# Patient Record
(demographics unavailable — no encounter records)

---

## 2025-03-18 NOTE — HISTORY OF PRESENT ILLNESS
[Born at ___ Wks Gestation] : The patient was born at [unfilled] weeks gestation [C/S] : via  section [C/S Indication: ____] : ( [unfilled] ) [Other: _____] : at [unfilled] [None] : There were no delivery complications [BW: _____] : weight of [unfilled] [Length: _____] : length of [unfilled] [HC: _____] : head circumference of [unfilled] [DW: _____] : Discharge weight was [unfilled] [Time of Birth: _____] : Time of birth was [unfilled] [Age: ___] : [unfilled] year old mother [G: ___] : G [unfilled] [P: ___] : P [unfilled] [Other: ____] : [unfilled] [Yes] : Yes [] : Circumcision: Yes [Breast milk] : breast milk [Expressed Breast milk ___oz/feed] : [unfilled] oz of expressed breast milk per feed [Hours between feeds ___] : Child is fed every [unfilled] hours [Normal] : Normal [___ voids per day] : [unfilled] voids per day [Frequency of stools: ___] : Frequency of stools: [unfilled]  stools [per day] : per day. [Green/brown] : green/brown [Yellow] : yellow [Pasty] : pasty [In Bassinet/Crib] : sleeps in bassinet/crib [On back] : sleeps on back [Pacifier] : Uses pacifier [No] : No cigarette smoke exposure [Rear facing car seat in back seat] : Rear facing car seat in back seat [Carbon Monoxide Detectors] : Carbon monoxide detectors at home [Smoke Detectors] : Smoke detectors at home. [NO] : No [(1) _____] : [unfilled] [(5) _____] : [unfilled]

## 2025-03-18 NOTE — PHYSICAL EXAM
[Alert] : alert [Normocephalic] : normocephalic [Molding] : molding [Caput Succedaneum] : caput succedaneum [Flat Open Anterior [place]] : flat open anterior fontanelle [PERRL] : PERRL [Red Reflex Bilateral] : red reflex bilateral [Normally Placed Ears] : normally placed ears [Auricles Well Formed] : auricles well formed [Nares Patent] : nares patent [Palate Intact] : palate intact [Uvula Midline] : uvula midline [Supple, full passive range of motion] : supple, full passive range of motion [Symmetric Chest Rise] : symmetric chest rise [Clear to Auscultation Bilaterally] : clear to auscultation bilaterally [Regular Rate and Rhythm] : regular rate and rhythm [S1, S2 present] : S1, S2 present [+2 Femoral Pulses] : +2 femoral pulses [Soft] : soft [Bowel Sounds] : bowel sounds present [Umbilical Stump Dry, Clean, Intact] : umbilical stump dry, clean, intact [Normal external genitailia] : normal external genitalia [Central Urethral Opening] : central urethral opening [Testicles Descended Bilaterally] : testicles descended bilaterally [Patent] : patent [Normally Placed] : normally placed [No Abnormal Lymph Nodes Palpated] : no abnormal lymph nodes palpated [Symmetric Flexed Extremities] : symmetric flexed extremities [Startle Reflex] : startle reflex present [Suck Reflex] : suck reflex present [Rooting] : rooting reflex present [Palmar Grasp] : palmar grasp present [Plantar Grasp] : plantar reflex present [Symmetric Fernie] : symmetric [place] [Dermal Melanocytosis] : Dermal Melanocytosis

## 2025-03-18 NOTE — PHYSICAL EXAM
[Alert] : alert [Normocephalic] : normocephalic [Molding] : molding [Caput Succedaneum] : caput succedaneum [Flat Open Anterior [place]] : flat open anterior fontanelle [PERRL] : PERRL [Red Reflex Bilateral] : red reflex bilateral [Normally Placed Ears] : normally placed ears [Auricles Well Formed] : auricles well formed [Nares Patent] : nares patent [Palate Intact] : palate intact [Uvula Midline] : uvula midline [Supple, full passive range of motion] : supple, full passive range of motion [Symmetric Chest Rise] : symmetric chest rise [Clear to Auscultation Bilaterally] : clear to auscultation bilaterally [Regular Rate and Rhythm] : regular rate and rhythm [S1, S2 present] : S1, S2 present [+2 Femoral Pulses] : +2 femoral pulses [Soft] : soft [Bowel Sounds] : bowel sounds present [Umbilical Stump Dry, Clean, Intact] : umbilical stump dry, clean, intact [Normal external genitailia] : normal external genitalia [Central Urethral Opening] : central urethral opening [Testicles Descended Bilaterally] : testicles descended bilaterally [Patent] : patent [Normally Placed] : normally placed [No Abnormal Lymph Nodes Palpated] : no abnormal lymph nodes palpated [Symmetric Flexed Extremities] : symmetric flexed extremities [Startle Reflex] : startle reflex present [Suck Reflex] : suck reflex present [Rooting] : rooting reflex present [Palmar Grasp] : palmar grasp present [Plantar Grasp] : plantar reflex present [Symmetric Fernie] : symmetric [place] [Dermal Melanocytosis] : Dermal Melanocytosis

## 2025-04-18 NOTE — HISTORY OF PRESENT ILLNESS
[Parents] : parents [Breast milk] : breast milk [Formula ___ oz/feed] : [unfilled] oz of formula per feed [Formula ___ oz in 24hrs] : [unfilled] oz of formula in 24 hours [Hours between feeds ___] : Child is fed every [unfilled] hours [___ Feeding per 24 hrs] : a  total of [unfilled] feedings in 24 hours [Normal] : Normal [___ voids per day] : [unfilled] voids per day [Frequency of stools: ___] : Frequency of stools: [unfilled]  stools [per day] : per day. [Yellow] : yellow [Loose] : loose consistency [In Bassinet/Crib] : sleeps in bassinet/crib [On back] : sleeps on back [Pacifier use] : Pacifier use [No] : No cigarette smoke exposure [Rear facing car seat in back seat] : Rear facing car seat in back seat [Carbon Monoxide Detectors] : Carbon monoxide detectors at home [Smoke Detectors] : Smoke detectors at home. [NO] : No [Co-sleeping] : no co-sleeping [Loose bedding, pillow, toys, and/or bumpers in crib] : no loose bedding, pillow, toys, and/or bumpers in crib [Exposure to electronic nicotine delivery system] : No exposure to electronic nicotine delivery system [Water heater temperature set at <120 degrees F] : Water heater temperature not set at <120 degrees F [At risk for exposure to TB] : Not at risk for exposure to Tuberculosis  [FreeTextEntry1] : 37 day old M ex- 39.2 weeker born via C/S for failure to progress presenting for HCM. Mother states concerns with baby being more fussy the past week, he is consolable but has been crying more throughout the day. Sleeps well at night. Mother also states small area of yellow patchy lesions between eyebrows and on scalp.      SDOH Screening Questionnaire   SDOH (Social Determinants of Health) Questionnaire: 1. Housing: Do you worry that in the upcoming months, your family, or child, may not have a safe or stable place to live? no 2. Food security: Within the last 12 months, did the food you bought not last and you did not have money to buy more? no 3. Community: Do you need help getting public benefits like food stamps or WIC? no 4. Transportation: Does your child have chronic medical condition and therefore struggle with transportation to attend medical appointments? no 5. Healthcare Access: Do you need help getting health or dental insurance? no       Result: Negative Screen. No further intervention needed.

## 2025-04-18 NOTE — REVIEW OF SYSTEMS
[Fussy] : fussy [Crying] : crying [Dry Skin] : dry skin [Seborrhea] : seborrhea [Negative] : Genitourinary

## 2025-04-18 NOTE — DISCUSSION/SUMMARY
[Normal Growth] : growth [Normal Development] : development  [No Elimination Concerns] : elimination [Continue Regimen] : feeding [No Skin Concerns] : skin [Normal Sleep Pattern] : sleep [None] : no medical problems [Anticipatory Guidance Given] : Anticipatory guidance addressed as per the history of present illness section [Parental Well-Being] : parental well-being [Family Adjustment] : family adjustment [Feeding Routines] : feeding routines [Infant Adjustment] : infant adjustment [Safety] : safety [Age Approp Vaccines] : Age appropriate vaccines administered [No Medications] : ~He/She~ is not on any medications [Parent/Guardian] : Parent/Guardian [FreeTextEntry1] : 37 day old M presenting for [facility]. Growth and development normal. PE remarkable for dermal melanocytosis on bilateral ankles and buttocks and one hyperpigmented birthmark on R shoulder and nostril. Patchy scaling crusts on the scalp and between eyebrows noted c/w seborrheic dermatitis. Maternal depression screen passed. Declines Hep B vaccine.   [facility] - Routine care & anticipatory guidance given - Continue ad reny feeds, receiving Vitamin D via maternal breastmilk (taking prenatals 10,000IU daily) - Hep B declined this visit. Discussed in length importance of immunization to provide protection against serious bacterial and viral illnesses. Side effects of vaccines discussed as well as risk vs. benefit. Parents were provided paper resources regarding each vaccine. Will readdress at next visit. - RTC for 2 month old HCM and prn   [facility] - Massage oil in to scalp 5 minutes before bathing infant to treat seborrhea.  - While shampooing lift flakes with fingers.  - Side effect of treatment may include but not limited to erythema of scalp - If no improvement, RTC for evaluation  Caretaker expressed understanding of the plan and agrees. All questions were answered.

## 2025-04-18 NOTE — PHYSICAL EXAM
[Alert] : alert [Crying] : crying [Normocephalic] : normocephalic [Flat Open Anterior [place]] : flat open anterior fontanelle [PERRL] : PERRL [Red Reflex Bilateral] : red reflex bilateral [Normally Placed Ears] : normally placed ears [Auricles Well Formed] : auricles well formed [Clear Tympanic membranes] : clear tympanic membranes [Light reflex present] : light reflex present [Bony landmarks visible] : bony landmarks visible [Nares Patent] : nares patent [Palate Intact] : palate intact [Uvula Midline] : uvula midline [Supple, full passive range of motion] : supple, full passive range of motion [Symmetric Chest Rise] : symmetric chest rise [Clear to Auscultation Bilaterally] : clear to auscultation bilaterally [Regular Rate and Rhythm] : regular rate and rhythm [S1, S2 present] : S1, S2 present [+2 Femoral Pulses] : +2 femoral pulses [Soft] : soft [Bowel Sounds] : bowel sounds present [Normal external genitailia] : normal external genitalia [Central Urethral Opening] : central urethral opening [Testicles Descended Bilaterally] : testicles descended bilaterally [Normally Placed] : normally placed [No Abnormal Lymph Nodes Palpated] : no abnormal lymph nodes palpated [Symmetric Flexed Extremities] : symmetric flexed extremities [Startle Reflex] : startle reflex present [Suck Reflex] : suck reflex present [Rooting] : rooting reflex present [Palmar Grasp] : palmar grasp reflex present [Plantar Grasp] : plantar grasp reflex present [Symmetric Fernie] : symmetric [place] [Dermal Melanocytosis] : Dermal Melanocytosis [Acute Distress] : no acute distress [Consolable] : inconsolable [Discharge] : no discharge [Palpable Masses] : no palpable masses [Murmurs] : no murmurs [Tender] : nontender [Distended] : not distended [Hepatomegaly] : no hepatomegaly [Splenomegaly] : no splenomegaly [Magdaleno-Ortolani] : negative Magdaleno-Ortolani [Spinal Dimple] : no spinal dimple [Tuft of Hair] : no tuft of hair [Jaundice] : no jaundice [Rash and/or lesion present] : no rash/lesion [FreeTextEntry2] : Cradle cap to scalp [de-identified] : Generalized dry skin

## 2025-06-06 NOTE — HISTORY OF PRESENT ILLNESS
[Expressed Breast milk ___oz/feed] : [unfilled] oz of expressed breast milk per feed [Hours between feeds ___] : Child is fed every [unfilled] hours [___ Feeding per 24 hrs] : a  total of [unfilled] feedings in 24 hours [Normal] : Normal [Frequency of stools: ___] : Frequency of stools: [unfilled]  stools [every ___ day(s)] : every [unfilled] day(s). [In Bassinet/Crib] : sleeps in bassinet/crib [On back] : sleeps on back [Pacifier use] : Pacifier use [No] : No cigarette smoke exposure [Rear facing car seat in back seat] : Rear facing car seat in back seat [Carbon Monoxide Detectors] : Carbon monoxide detectors at home [Smoke Detectors] : Smoke detectors at home. [NO] : No [Co-sleeping] : no co-sleeping [Loose bedding, pillow, toys, and/or bumpers in crib] : no loose bedding, pillow, toys, and/or bumpers in crib [FreeTextEntry1] : 2-month-old male brought in by mother presents for well child visit. Mom reports he has been having less bowel movements x 2 weeks. She states he used to have 1 every day and no he his having 1 every 2 days. HE has 6+ voids per day. Bowel movements appear normal, no change in diet. Mom also states he is gassier than normal as well. Pt is  only every 2-3 hours. She is concerned in past weeks he no longer wants to nap but reports he sleeps well throughout the night (8 hours).  Mom also notices that the umbilicus protrudes a decent amount when pt is crying but reduces once pt stops. She is wondering if this is normal.  Does not want vaccinations at this time  Denies fever, rashes, sick contacts, changes in urine.     SDOH Screening Questionnaire   SDOH (Social Determinants of Health) Questionnaire: 1. Housing: Do you worry that in the upcoming months, your family, or child, may not have a safe or stable place to live? no 2. Food security: Within the last 12 months, did the food you bought not last and you did not have money to buy more? no 3. Community: Do you need help getting public benefits like food stamps or WIC? no 4. Transportation: Does your child have chronic medical condition and therefore struggle with transportation to attend medical appointments? no 5. Healthcare Access: Do you need help getting health or dental insurance? no       Result: Negative Screen. No further intervention needed.

## 2025-06-06 NOTE — DISCUSSION/SUMMARY
[FreeTextEntry1] : No growth, development, elimination, feeding, skin and sleep concerns. no medical problems. Anticipatory guidance addressed as per the history of present illness section.   The following 1 month anticipatory guidance topics were discussed and/or handouts given: parental well-being, family adjustment, feeding routines, infant adjustment and safety. He is not on any medications. Information discussed with Parent/Guardian.2  2-month-old Male presenting for HCM. Growth and development are normal. PE unremarkable. The maternal depression screen passed. Declines vaccines.  HCM - Routine care & anticipatory guidance given - Tylenol every 4 hours prn for fever or pain - Continue ad reny feeds -Declined 2 mo vaccines. Discussed in length importance of immunization to provide protection against serious bacterial and viral illnesses. Side effects of vaccines discussed as well as risk vs. benefit. Parents were provided paper resources regarding each vaccine. Will readdress at next visit. - RTC for 4-month-old HCM and prn  UMBILICAL HERNIA -return to office if hernia gets larger, seems swollen, or is hard, if you can't push it back in, or the skin over the hernia is red or looks darker than usual   Caretaker expressed understanding of the plan and agrees. All questions were answered.

## 2025-06-06 NOTE — PHYSICAL EXAM
[Alert] : alert [Normocephalic] : normocephalic [Flat Open Anterior [place]] : flat open anterior fontanelle [PERRL] : PERRL [Red Reflex Bilateral] : red reflex bilateral [Normally Placed Ears] : normally placed ears [Auricles Well Formed] : auricles well formed [Clear Tympanic membranes] : clear tympanic membranes [Light reflex present] : light reflex present [Bony landmarks visible] : bony landmarks visible [Nares Patent] : nares patent [Palate Intact] : palate intact [Uvula Midline] : uvula midline [Supple, full passive range of motion] : supple, full passive range of motion [Symmetric Chest Rise] : symmetric chest rise [Clear to Auscultation Bilaterally] : clear to auscultation bilaterally [Regular Rate and Rhythm] : regular rate and rhythm [S1, S2 present] : S1, S2 present [+2 Femoral Pulses] : +2 femoral pulses [Soft] : soft [Bowel Sounds] : bowel sounds present [Normal external genitailia] : normal external genitalia [Central Urethral Opening] : central urethral opening [Testicles Descended Bilaterally] : testicles descended bilaterally [Normally Placed] : normally placed [No Abnormal Lymph Nodes Palpated] : no abnormal lymph nodes palpated [Symmetric Flexed Extremities] : symmetric flexed extremities [Startle Reflex] : startle reflex present [Suck Reflex] : suck reflex present [Rooting] : rooting reflex present [Palmar Grasp] : palmar grasp reflex present [Plantar Grasp] : plantar grasp reflex present [Symmetric Fernie] : symmetric [place] [Acute Distress] : no acute distress [Discharge] : no discharge [Palpable Masses] : no palpable masses [Murmurs] : no murmurs [Tender] : nontender [Distended] : not distended [Hepatomegaly] : no hepatomegaly [Splenomegaly] : no splenomegaly [Magdaleno-Ortolani] : negative Magdaleno-Ortolani [Spinal Dimple] : no spinal dimple [Tuft of Hair] : no tuft of hair [Rash and/or lesion present] : no rash/lesion [de-identified] : [place] spot noted on pt buttock and nostrils.

## 2025-07-24 NOTE — PHYSICAL EXAM
[Alert] : alert [Acute Distress] : no acute distress [Normocephalic] : normocephalic [Flat Open Anterior [place]] : flat open anterior fontanelle [Red Reflex] : red reflex bilateral [PERRL] : PERRL [Normally Placed Ears] : normally placed ears [Auricles Well Formed] : auricles well formed [Clear Tympanic membranes] : clear tympanic membranes [Light reflex present] : light reflex present [Bony landmarks visible] : bony landmarks visible [Discharge] : no discharge [Nares Patent] : nares patent [Palate Intact] : palate intact [Uvula Midline] : uvula midline [Palpable Masses] : no palpable masses [Symmetric Chest Rise] : symmetric chest rise [Clear to Auscultation Bilaterally] : clear to auscultation bilaterally [Regular Rate and Rhythm] : regular rate and rhythm [S1, S2 present] : S1, S2 present [Murmurs] : no murmurs [+2 Femoral Pulses] : (+) 2 femoral pulses [Soft] : soft [Tender] : nontender [Distended] : nondistended [Bowel Sounds] : bowel sounds present [Hepatomegaly] : no hepatomegaly [Splenomegaly] : no splenomegaly [Central Urethral Opening] : central urethral opening [Testicles Descended] : testicles descended bilaterally [Patent] : patent [Normally Placed] : normally placed [No Abnormal Lymph Nodes Palpated] : no abnormal lymph nodes palpated [Magdaleno-Ortolani] : negative Magdaleno-Ortolani [Allis Sign] : negative Allis sign [Spinal Dimple] : no spinal dimple [Tuft of Hair] : no tuft of hair [Startle Reflex] : startle reflex present [Plantar Grasp] : plantar grasp reflex present [Symmetric Fernie] : symmetric fernie [Rash or Lesions] : no rash/lesions [Dermal Melanocytosis] : Dermal Melanocytosis [de-identified] : [place] spots to back, arms

## 2025-07-24 NOTE — HISTORY OF PRESENT ILLNESS
[Vitamins ___] : no vitamins [Fruits] : no fruits [Vegetables] : no vegetables [Cereal] : no cereal [Co-sleeping] : no co-sleeping [Loose bedding, pillow, toys, and/or bumpers in crib] : no loose bedding, pillow, toys, and/or bumpers in crib [Screen time only for video chatting] : screen time not just for video chatting [Exposure to electronic nicotine delivery system] : No exposure to electronic nicotine delivery system [de-identified] : Abnormal eye movements while staring at clock, he is alert and can be interupted [de-identified] : No formula [FreeTextEntry1] : SDOH Screening Questionnaire  SDOH (Social Determinants of Health) Questionnaire: 1. Housing: Do you worry that in the upcoming months, your family, or child, may not have a safe or stable place to live? No  2. Food security: Within the last 12 months, did the food you bought not last and you did not have money to buy more? No  3. Community: Do you need help getting public benefits like food stamps or WIC? No  4. Transportation: Does your child have chronic medical condition and therefore struggle with transportation to attend medical appointments? No  5. Healthcare Access: Do you need help getting health or dental insurance? No     Result: Negative Screen. No further intervention needed.

## 2025-07-24 NOTE — HISTORY OF PRESENT ILLNESS
[Vitamins ___] : no vitamins [Fruits] : no fruits [Vegetables] : no vegetables [Cereal] : no cereal [Co-sleeping] : no co-sleeping [Loose bedding, pillow, toys, and/or bumpers in crib] : no loose bedding, pillow, toys, and/or bumpers in crib [Screen time only for video chatting] : screen time not just for video chatting [Exposure to electronic nicotine delivery system] : No exposure to electronic nicotine delivery system [de-identified] : Abnormal eye movements while staring at clock, he is alert and can be interupted [de-identified] : No formula [FreeTextEntry1] : SDOH Screening Questionnaire  SDOH (Social Determinants of Health) Questionnaire: 1. Housing: Do you worry that in the upcoming months, your family, or child, may not have a safe or stable place to live? No  2. Food security: Within the last 12 months, did the food you bought not last and you did not have money to buy more? No  3. Community: Do you need help getting public benefits like food stamps or WIC? No  4. Transportation: Does your child have chronic medical condition and therefore struggle with transportation to attend medical appointments? No  5. Healthcare Access: Do you need help getting health or dental insurance? No     Result: Negative Screen. No further intervention needed.

## 2025-07-24 NOTE — PHYSICAL EXAM
[Alert] : alert [Acute Distress] : no acute distress [Normocephalic] : normocephalic [Flat Open Anterior [place]] : flat open anterior fontanelle [Red Reflex] : red reflex bilateral [PERRL] : PERRL [Normally Placed Ears] : normally placed ears [Auricles Well Formed] : auricles well formed [Clear Tympanic membranes] : clear tympanic membranes [Light reflex present] : light reflex present [Bony landmarks visible] : bony landmarks visible [Discharge] : no discharge [Nares Patent] : nares patent [Palate Intact] : palate intact [Uvula Midline] : uvula midline [Palpable Masses] : no palpable masses [Symmetric Chest Rise] : symmetric chest rise [Clear to Auscultation Bilaterally] : clear to auscultation bilaterally [Regular Rate and Rhythm] : regular rate and rhythm [S1, S2 present] : S1, S2 present [Murmurs] : no murmurs [+2 Femoral Pulses] : (+) 2 femoral pulses [Soft] : soft [Tender] : nontender [Distended] : nondistended [Bowel Sounds] : bowel sounds present [Hepatomegaly] : no hepatomegaly [Splenomegaly] : no splenomegaly [Central Urethral Opening] : central urethral opening [Testicles Descended] : testicles descended bilaterally [Patent] : patent [Normally Placed] : normally placed [No Abnormal Lymph Nodes Palpated] : no abnormal lymph nodes palpated [Magdaleno-Ortolani] : negative Magdaleno-Ortolani [Allis Sign] : negative Allis sign [Spinal Dimple] : no spinal dimple [Tuft of Hair] : no tuft of hair [Startle Reflex] : startle reflex present [Plantar Grasp] : plantar grasp reflex present [Symmetric Fernie] : symmetric fernie [Rash or Lesions] : no rash/lesions [Dermal Melanocytosis] : Dermal Melanocytosis [de-identified] : [place] spots to back, arms

## 2025-07-24 NOTE — DISCUSSION/SUMMARY
[Normal Growth] : growth [Normal Development] : development  [No Elimination Concerns] : elimination [Continue Regimen] : feeding [No Skin Concerns] : skin [Normal Sleep Pattern] : sleep [None] : no medical problems [Anticipatory Guidance Given] : Anticipatory guidance addressed as per the history of present illness section [Family Functioning] : family functioning [Nutritional Adequacy and Growth] : nutritional adequacy and growth [Infant Development] : infant development [Oral Health] : oral health [Safety] : safety [Age Approp Vaccines] : Age appropriate vaccines administered [No Medications] : ~He/She~ is not on any medications [Parent/Guardian] : Parent/Guardian [FreeTextEntry1] :  4 month old exclusively  infant, unvaccinated presenting for HCM. Growth and development normal. 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 Maternal depression screen passed. Immunizations UTD.  - Routine care & anticipatory guidance given - Vaccines declined this visit by parents. Discussed in length importance of immunization to provide protection against serious bacterial and viral illnesses. Side effects of vaccines discussed as well as risk vs. benefit. Will readdress at next visit. - Continue ad reny feeds of breastmilk - No head lag however exclusively : counseled on intro to solids starting with infant cereal, may slowly introduce stage 1 baby foods at 6mos of age - Choking hazards reviewed, no cows milk or honey until after age 1 year old - Advised likely eye movements are normal as only occurs with colorful clock in the house, if increasing in frequency or duration, advised RTC for evaluation. Will continue to monitor.  - RTC for 6 month old HCM and prn  Caretaker expressed understanding of the plan and agrees. All questions were answered.

## 2025-07-24 NOTE — DEVELOPMENTAL MILESTONES
[Laughs aloud] : laughs aloud [Turns to voice] : turns to voice [Vocalizes with extending cooing] : vocalizes with extending cooing [Rolls over prone to supine] : rolls over prone to supine [Supports on elbows & wrists in prone] : supports on elbows and wrists in prone